# Patient Record
Sex: FEMALE | Race: WHITE | NOT HISPANIC OR LATINO | Employment: FULL TIME | URBAN - METROPOLITAN AREA
[De-identification: names, ages, dates, MRNs, and addresses within clinical notes are randomized per-mention and may not be internally consistent; named-entity substitution may affect disease eponyms.]

---

## 2022-11-11 DIAGNOSIS — Z12.11 SCREENING FOR COLON CANCER: Primary | ICD-10-CM

## 2023-07-25 DIAGNOSIS — R41.3 OTHER AMNESIA: Primary | ICD-10-CM

## 2023-11-09 ENCOUNTER — OFFICE VISIT (OUTPATIENT)
Dept: NEUROLOGY | Facility: CLINIC | Age: 58
End: 2023-11-09
Payer: COMMERCIAL

## 2023-11-09 VITALS
DIASTOLIC BLOOD PRESSURE: 84 MMHG | SYSTOLIC BLOOD PRESSURE: 138 MMHG | BODY MASS INDEX: 30 KG/M2 | WEIGHT: 163 LBS | HEIGHT: 62 IN

## 2023-11-09 DIAGNOSIS — R41.3 OTHER AMNESIA: ICD-10-CM

## 2023-11-09 DIAGNOSIS — R41.89 COGNITIVE CHANGES: Primary | ICD-10-CM

## 2023-11-09 PROCEDURE — 86255 FLUORESCENT ANTIBODY SCREEN: CPT

## 2023-11-09 PROCEDURE — 83519 RIA NONANTIBODY: CPT

## 2023-11-09 PROCEDURE — 99204 PR OFFICE/OUTPT VISIT, NEW, LEVL IV, 45-59 MIN: ICD-10-PCS | Mod: S$GLB,,, | Performed by: PSYCHIATRY & NEUROLOGY

## 2023-11-09 PROCEDURE — 99999 PR PBB SHADOW E&M-EST. PATIENT-LVL III: ICD-10-PCS | Mod: PBBFAC,,, | Performed by: PSYCHIATRY & NEUROLOGY

## 2023-11-09 PROCEDURE — 99204 OFFICE O/P NEW MOD 45 MIN: CPT | Mod: S$GLB,,, | Performed by: PSYCHIATRY & NEUROLOGY

## 2023-11-09 PROCEDURE — 99999 PR PBB SHADOW E&M-EST. PATIENT-LVL III: CPT | Mod: PBBFAC,,, | Performed by: PSYCHIATRY & NEUROLOGY

## 2023-11-09 RX ORDER — DOXEPIN HYDROCHLORIDE 25 MG/1
25 CAPSULE ORAL NIGHTLY
COMMUNITY
End: 2023-11-09

## 2023-11-09 RX ORDER — CLONAZEPAM 0.5 MG/1
0.5 TABLET ORAL DAILY
COMMUNITY
Start: 2023-10-24

## 2023-11-09 RX ORDER — OMEPRAZOLE 10 MG/1
10 CAPSULE, DELAYED RELEASE ORAL DAILY
COMMUNITY
End: 2023-11-09

## 2023-11-09 RX ORDER — FLUOXETINE HYDROCHLORIDE 40 MG/1
40 CAPSULE ORAL DAILY
COMMUNITY
End: 2023-11-09

## 2023-11-09 RX ORDER — ROSUVASTATIN CALCIUM 10 MG/1
10 TABLET, COATED ORAL NIGHTLY
COMMUNITY
Start: 2023-10-09

## 2023-11-09 RX ORDER — CARIPRAZINE 3 MG/1
3 CAPSULE, GELATIN COATED ORAL EVERY MORNING
COMMUNITY
Start: 2023-10-11

## 2023-11-09 RX ORDER — EZETIMIBE 10 MG/1
10 TABLET ORAL DAILY
COMMUNITY
Start: 2023-11-03

## 2023-11-09 RX ORDER — TRAZODONE HYDROCHLORIDE 50 MG/1
50 TABLET ORAL NIGHTLY
COMMUNITY
Start: 2023-04-03

## 2023-11-09 RX ORDER — PANTOPRAZOLE SODIUM 40 MG/1
40 TABLET, DELAYED RELEASE ORAL DAILY
COMMUNITY
Start: 2023-11-03

## 2023-11-09 RX ORDER — BUPROPION HYDROCHLORIDE 150 MG/1
150 TABLET, EXTENDED RELEASE ORAL 2 TIMES DAILY
COMMUNITY
End: 2023-11-09

## 2023-11-09 NOTE — PROGRESS NOTES
Chief Complaint   Patient presents with    other amnesia     New patient referred by Angie Watson NP for other amnesia. Patient reports she was told she had TIA on imaging and early onset dementia. Patient denies any head trauma. Decline in memory over the last year both short and long term memory. Has difficulty working she has to map out her day to a set schedule. Patient states memory loss had a immediate decline over last year.        This is a 58 y.o. female  here for changes in memory.  Symptoms started about a year ago.  She denies any tremor.  She has a history of longstanding anxiety and depression has been on medication for that but it did not get worse prior to these cognitive changes.  She finds that her thinking is slower, she has issues with short-term memory.  She has had difficulty with work performance.  She was put on Vraylar but the cognitive changes predate starting this medication.  Vraylar was for depression.  She does not think this medication is related to her cognitive decline.  She denies any physical changes although she has had some consciousness with balance/walking.  She underwent a brain MRI, images not available to us in [place] that showed scattered nonspecific foci of T2 prolongation in subcortical and periventricular white matter which may be seen in chronic microvascular ischemic change.  She takes clonazepam 0.25 daily.        Medication List with Changes/Refills   Current Medications    CLONAZEPAM (KLONOPIN) 0.5 MG TABLET    Take 0.5 mg by mouth.    EZETIMIBE (ZETIA) 10 MG TABLET    Take 10 mg by mouth.    PANTOPRAZOLE (PROTONIX) 40 MG TABLET    Take 40 mg by mouth.    ROSUVASTATIN (CRESTOR) 10 MG TABLET    Take 10 mg by mouth every evening.    TRAZODONE (DESYREL) 50 MG TABLET        VRAYLAR 3 MG CAP    Take 3 mg by mouth every morning.   Discontinued Medications    BUPROPION (WELLBUTRIN SR) 150 MG TBSR 12 HR TABLET    Take 150 mg by mouth 2 (two) times daily.    DOXEPIN  (SINEQUAN) 25 MG CAPSULE    Take 25 mg by mouth every evening.    FLUOXETINE 40 MG CAPSULE    Take 40 mg by mouth once daily.    OMEPRAZOLE (PRILOSEC) 10 MG CAPSULE    Take 10 mg by mouth once daily.        Past Surgical History:   Procedure Laterality Date    ANGIOPLASTY       SECTION      COLONOSCOPY      HYSTERECTOMY      TUBAL LIGATION          Past Medical History:   Diagnosis Date    Acid reflux     Arthritis     Carpal tunnel syndrome     Dizziness and giddiness     Heart attack     Lumbago     Other amnesia     Paresthesia of skin     Spondylolisthesis         Family History   Problem Relation Age of Onset    Cancer Mother     Heart disease Father     Diabetes Father         Social History     Socioeconomic History    Marital status:    Tobacco Use    Smoking status: Every Day     Types: Vaping with nicotine    Smokeless tobacco: Never   Substance and Sexual Activity    Alcohol use: Not Currently    Drug use: Never    Sexual activity: Yes     Partners: Male          Review of Systems  Review of Systems   Constitutional: Negative for appetite change.   HENT: Negative for sinus pressure and sore throat.    Eyes: Negative for visual disturbance.   Respiratory: Negative for cough and shortness of breath.    Cardiovascular: Negative for chest pain.   Gastrointestinal: Negative for diarrhea and nausea.   Endocrine: Negative for cold intolerance and heat intolerance.   Genitourinary: Negative for dysuria.   Musculoskeletal: Negative for arthralgias and myalgias.   Skin: Negative for rash.   Allergic/Immunologic: Negative for immunocompromised state.   Neurological:        See HPI   Hematological: Does not bruise/bleed easily.   Psychiatric/Behavioral: Negative for hallucinations.      General: alert and oriented, no acute distress, no audible wheezes, pulse intact, no edema    Vitals:    23 1004   BP: 138/84        Cognition and Comprehension  Speech and language intact  Follows  commands  Speech fluent  Attention intact  Memory for recent events intact from history taking  Affect pleasant  Fund of knowledge adequate    Cranial nerves  II. Optic: Visual fields full to confrontation both eyes  III, IV, VI. Oculomotor: Intact, Pupils equal, round and reactive to light, no nystagmus  V. Trigeminal: sensation to light touch normal  VII. No facial asymmetry or facial weakness  VIII. Hearing intact to spoken voice  IX/X. Glossopharyngeal/Vagus: Voice normal, palate rises symmetrically  XI. Axillary: Shoulder shrug normal  XII. Hypoglossal: Intact    Muscle Strength and Tone  Normal upper extremity tone  Normal lower extremity tone  Normal upper extremity strength  Normal lower extremity strength  Mild rigidity RUE, mild bradykinesia LUE    Sensation  Intact to light touch and temperature    Reflexes  Normal and symmetric    Coordination and Gait  Finger to nose normal  Gait normal       Sandra was seen today for other amnesia.    Diagnoses and all orders for this visit:    Cognitive changes  -     Vitamin B12; Future  -     TSH; Future  -     Hemoglobin A1C; Future  -     HIV 1/2 Ag/Ab (4th Gen); Future  -     Ammonia; Future  -     Thyrotropin Receptor Antibody; Future  -     Thyroglobulin; Future  -     [facility] GENERIC ORDERABLE serum paraneoplastic antibody eval (PAVAL); Future  -     FL LUMBAR PUNCTURE DIAGNOSTIC WITH IMAGING; Future  -     Alzheimer's Disease Evaluation, CSF; Future  -     Cell Count, CSF; Future  -     Protein, CSF; Future  -     Glucose, CSF; Future  -     Cerebrospinal Fluid Culture; Future  -     Neuropsychological testing; Future    Other amnesia  -     Ambulatory referral/consult to Neurology       Given cognitive changes in someone her age, full workup for dementia would be prudent.  We will try to get images of her MRI.  Recommend LP and neuropsych testing.  Holding on medication at this time while we do further evaluation.

## 2023-11-10 DIAGNOSIS — R41.89 AKINETIC MUTISM: Primary | ICD-10-CM

## 2023-11-14 ENCOUNTER — TELEPHONE (OUTPATIENT)
Dept: NEUROLOGY | Facility: CLINIC | Age: 58
End: 2023-11-14
Payer: COMMERCIAL

## 2023-11-14 NOTE — TELEPHONE ENCOUNTER
States unable to gave LP on 11/17/2023 is having removal of all her teeth today. Will call to reschedule lumbar puncture.

## 2023-11-14 NOTE — TELEPHONE ENCOUNTER
Maritza with central scheduling contacted to cancer LP that is schedule for Nov. 17. Patient will call to reschedule.

## 2023-11-22 ENCOUNTER — HOSPITAL ENCOUNTER (OUTPATIENT)
Dept: RADIOLOGY | Facility: HOSPITAL | Age: 58
Discharge: HOME | End: 2023-11-22
Attending: PSYCHIATRY & NEUROLOGY
Payer: COMMERCIAL

## 2023-11-22 VITALS
HEART RATE: 70 BPM | RESPIRATION RATE: 18 BRPM | DIASTOLIC BLOOD PRESSURE: 70 MMHG | SYSTOLIC BLOOD PRESSURE: 108 MMHG | OXYGEN SATURATION: 95 %

## 2023-11-22 DIAGNOSIS — R41.89 COGNITIVE CHANGES: ICD-10-CM

## 2023-11-22 LAB
APPEARANCE CSF: CLEAR
COLOR CSF: COLORLESS
GLUCOSE CSF-MCNC: 56 MG/DL (ref 40–70)
LYMPHOCYTE MANUAL CSF (OHS): 92 %
MONOCYTE MANUAL CSF (OHS): 8 %
POC PTINR: 1 (ref 0.9–1.2)
POC PTWBT: 12.5 SEC (ref 9.7–14.3)
PROT CSF-MCNC: 57 MG/DL (ref 15–45)
RBC # CSF MANUAL: 0 /UL
SAMPLE: NORMAL
WBC # CSF MANUAL: 1 /UL (ref 0–5)

## 2023-11-22 PROCEDURE — 84157 ASSAY OF PROTEIN OTHER: CPT

## 2023-11-22 PROCEDURE — 87070 CULTURE OTHR SPECIMN AEROBIC: CPT

## 2023-11-22 PROCEDURE — 62328 DX LMBR SPI PNXR W/FLUOR/CT: CPT

## 2023-11-22 PROCEDURE — 30000890 MAYO GENERIC ORDERABLE: Performed by: PSYCHIATRY & NEUROLOGY

## 2023-11-22 PROCEDURE — 89051 BODY FLUID CELL COUNT: CPT

## 2023-11-22 PROCEDURE — 83520 IMMUNOASSAY QUANT NOS NONAB: CPT | Performed by: PSYCHIATRY & NEUROLOGY

## 2023-11-22 PROCEDURE — 82945 GLUCOSE OTHER FLUID: CPT

## 2023-11-22 NOTE — DISCHARGE INSTRUCTIONS
Recumbent position during transportation to home   Recumbent position at home with head slightly elevated X 12-hours except for bathroom privileges

## 2023-11-27 ENCOUNTER — TELEPHONE (OUTPATIENT)
Dept: NEUROLOGY | Facility: CLINIC | Age: 58
End: 2023-11-27
Payer: COMMERCIAL

## 2023-11-27 LAB — BACTERIA CSF CULT: NORMAL

## 2023-11-27 NOTE — TELEPHONE ENCOUNTER
I reviewed her spinal tap results and they are overall unremarkable so far but I am still waiting on her alzheimer's test that was a send off test. Her protein was slightly elevated on her preliminary test but this is nonspecific and nothing to worry about.

## 2023-11-27 NOTE — TELEPHONE ENCOUNTER
Patient called requesting a call back to further discuss her Lumbar Puncture Diagnostic w/ imaging that was done on 11/22/2023. States she can she the results on her patient portal, but don't understand it. Please advise.

## 2023-11-28 ENCOUNTER — TELEPHONE (OUTPATIENT)
Dept: NEUROLOGY | Facility: CLINIC | Age: 58
End: 2023-11-28
Payer: COMMERCIAL

## 2023-11-28 LAB — MAYO GENERIC ORDERABLE RESULT: NORMAL

## 2023-11-28 NOTE — PROGRESS NOTES
Testing for alzheimer's disease resulted today and was negative. This means most likely memory changes are not due to alzheimer's disease. We can discuss further at follow up

## 2023-11-28 NOTE — TELEPHONE ENCOUNTER
----- Message from Mikayla Mendez MD sent at 11/28/2023  1:29 PM CST -----  Testing for alzheimer's disease resulted today and was negative. This means most likely memory changes are not due to alzheimer's disease. We can discuss further at follow up

## 2024-01-17 ENCOUNTER — OFFICE VISIT (OUTPATIENT)
Dept: NEUROLOGY | Facility: CLINIC | Age: 59
End: 2024-01-17
Payer: COMMERCIAL

## 2024-01-17 VITALS
DIASTOLIC BLOOD PRESSURE: 78 MMHG | WEIGHT: 153.81 LBS | SYSTOLIC BLOOD PRESSURE: 119 MMHG | BODY MASS INDEX: 28.31 KG/M2 | HEART RATE: 75 BPM | HEIGHT: 62 IN

## 2024-01-17 DIAGNOSIS — F41.9 ANXIETY: Primary | ICD-10-CM

## 2024-01-17 DIAGNOSIS — R41.89 COGNITIVE CHANGES: ICD-10-CM

## 2024-01-17 PROCEDURE — 99215 OFFICE O/P EST HI 40 MIN: CPT | Mod: S$GLB,,, | Performed by: PSYCHIATRY & NEUROLOGY

## 2024-01-17 PROCEDURE — 99999 PR PBB SHADOW E&M-EST. PATIENT-LVL III: CPT | Mod: PBBFAC,,, | Performed by: PSYCHIATRY & NEUROLOGY

## 2024-01-17 RX ORDER — SERTRALINE HYDROCHLORIDE 50 MG/1
50 TABLET, FILM COATED ORAL DAILY
Qty: 30 TABLET | Refills: 11 | Status: SHIPPED | OUTPATIENT
Start: 2024-01-17 | End: 2025-01-16

## 2024-01-17 NOTE — PROGRESS NOTES
Chief Complaint   Patient presents with    Memory Loss     Pt here today to discuss LP         This is a 58 y.o. female here for follow up for cognitive changes.  Patient continues to complain that she is not herself.  She finds that her thinking is slower and she is having issues with short-term memory.  She is having difficulty with work performance due to these issues.  She is noting some incoordination with fine motor task, feels that it is taking too long for her brain to control her movements.  She underwent spinal tap which was negative for signs of Alzheimer's disease.  Also underwent thyroid testing which was negative.  Neuropsych testing was ordered at last visit but referral was not sent.    Medication List with Changes/Refills   New Medications    SERTRALINE (ZOLOFT) 50 MG TABLET    Take 1 tablet (50 mg total) by mouth once daily.   Current Medications    CLONAZEPAM (KLONOPIN) 0.5 MG TABLET    Take 0.5 mg by mouth once daily.    EZETIMIBE (ZETIA) 10 MG TABLET    Take 10 mg by mouth once daily.    PANTOPRAZOLE (PROTONIX) 40 MG TABLET    Take 40 mg by mouth once daily.    ROSUVASTATIN (CRESTOR) 10 MG TABLET    Take 10 mg by mouth every evening.    TRAZODONE (DESYREL) 50 MG TABLET    Take 50 mg by mouth every evening.    VRAYLAR 3 MG CAP    Take 3 mg by mouth every morning.        Vitals:    01/17/24 0918   BP: 119/78   Pulse: 75        NAD  Alert and oriented  Cognition and perception intact  No aphasia  EOMI  No facial asymmetry  No dysarthria  Moves all extremities symmetrically  No gross coordination abnormalities  Gait normal     1. Anxiety  -     sertraline (ZOLOFT) 50 MG tablet; Take 1 tablet (50 mg total) by mouth once daily.  Dispense: 30 tablet; Refill: 11    2. Cognitive changes  -     Ambulatory referral/consult to Adult Neuropsychology       Continue to suspect that Vraylar could be causing some of these changes.  Would try a switch to Zoloft if okay with the prescribing doctor.  She does not  see a psychiatrist.  Continue to recommend neuropsych testing to flush out whether this memory loss is anxiety/depression related or medication related or true dementia